# Patient Record
Sex: MALE | Race: WHITE | Employment: FULL TIME | URBAN - METROPOLITAN AREA
[De-identification: names, ages, dates, MRNs, and addresses within clinical notes are randomized per-mention and may not be internally consistent; named-entity substitution may affect disease eponyms.]

---

## 2022-01-17 ENCOUNTER — HOSPITAL ENCOUNTER (OUTPATIENT)
Dept: ULTRASOUND IMAGING | Age: 42
Discharge: HOME | End: 2022-01-17
Payer: COMMERCIAL

## 2022-01-17 DIAGNOSIS — R10.9 ABDOMINAL PAIN, UNSPECIFIED ABDOMINAL LOCATION: ICD-10-CM

## 2022-01-17 PROCEDURE — 76700 US EXAM ABDOM COMPLETE: CPT

## 2022-06-29 ENCOUNTER — TELEPHONE (OUTPATIENT)
Dept: PULMONOLOGY | Age: 42
End: 2022-06-29

## 2022-11-01 ENCOUNTER — OFFICE VISIT (OUTPATIENT)
Dept: SLEEP MEDICINE | Age: 42
End: 2022-11-01
Payer: COMMERCIAL

## 2022-11-01 VITALS
SYSTOLIC BLOOD PRESSURE: 130 MMHG | DIASTOLIC BLOOD PRESSURE: 70 MMHG | WEIGHT: 242.6 LBS | BODY MASS INDEX: 38.99 KG/M2 | HEIGHT: 66 IN | RESPIRATION RATE: 20 BRPM | OXYGEN SATURATION: 99 % | TEMPERATURE: 97.9 F | HEART RATE: 82 BPM

## 2022-11-01 DIAGNOSIS — E66.09 CLASS 2 OBESITY DUE TO EXCESS CALORIES WITHOUT SERIOUS COMORBIDITY WITH BODY MASS INDEX (BMI) OF 39.0 TO 39.9 IN ADULT: ICD-10-CM

## 2022-11-01 DIAGNOSIS — G47.19 EXCESSIVE DAYTIME SLEEPINESS: ICD-10-CM

## 2022-11-01 DIAGNOSIS — G47.33 OSA (OBSTRUCTIVE SLEEP APNEA): Primary | ICD-10-CM

## 2022-11-01 DIAGNOSIS — R06.83 SNORING: ICD-10-CM

## 2022-11-01 DIAGNOSIS — R06.81 WITNESSED EPISODE OF APNEA: ICD-10-CM

## 2022-11-01 PROCEDURE — 99204 OFFICE O/P NEW MOD 45 MIN: CPT | Performed by: PSYCHIATRY & NEUROLOGY

## 2022-11-01 ASSESSMENT — SLEEP AND FATIGUE QUESTIONNAIRES
HOW LIKELY ARE YOU TO NOD OFF OR FALL ASLEEP WHILE WATCHING TV: 3
NECK CIRCUMFERENCE (INCHES): 19
HOW LIKELY ARE YOU TO NOD OFF OR FALL ASLEEP WHILE SITTING AND READING: 1
HOW LIKELY ARE YOU TO NOD OFF OR FALL ASLEEP WHILE LYING DOWN TO REST IN THE AFTERNOON WHEN CIRCUMSTANCES PERMIT: 2
HOW LIKELY ARE YOU TO NOD OFF OR FALL ASLEEP WHILE SITTING INACTIVE IN A PUBLIC PLACE: 0
HOW LIKELY ARE YOU TO NOD OFF OR FALL ASLEEP IN A CAR, WHILE STOPPED FOR A FEW MINUTES IN TRAFFIC: 0
HOW LIKELY ARE YOU TO NOD OFF OR FALL ASLEEP WHILE SITTING AND TALKING TO SOMEONE: 0
HOW LIKELY ARE YOU TO NOD OFF OR FALL ASLEEP WHEN YOU ARE A PASSENGER IN A CAR FOR AN HOUR WITHOUT A BREAK: 3
HOW LIKELY ARE YOU TO NOD OFF OR FALL ASLEEP WHILE SITTING QUIETLY AFTER LUNCH WITHOUT ALCOHOL: 0
ESS TOTAL SCORE: 9

## 2022-11-01 ASSESSMENT — ENCOUNTER SYMPTOMS
SORE THROAT: 1
CHOKING: 1
EYES NEGATIVE: 1
APNEA: 1

## 2022-11-01 NOTE — PROGRESS NOTES
MD KECIA De La O Board Certified in Sleep Medicine  Certified [facility] Sleep Medicine  Board Certified in Neurology [address]  [facility] [address], [facility] 232 (2209 [facility]  [address], [address] [facility]           [address] [address] [phone]    Subjective:     Patient ID: Denver Rivas is a 40 y.o. male. Chief Complaint   Patient presents with    Establish Care    Snoring       HPI:        Denver Rivas is a 40 y.o. male referred by Hunter Montano for a sleep evaluation. He complains of snoring, snorting, choking, periods of not breathing, tossing and turning, kicking, excessive daytime sleepiness, feels sleepy during the day but he denies knees buckling with laughing, completely or partially paralyzed while falling asleep or waking up, noisy environment, uncomfortable room temperature, uncomfortable bedding. Symptoms began 4 years ago, gradually worsening since that time. The patient's bed-partner confirmed the snoring and stopped breathing at night. SLEEP SCHEDULE: Goes to bed around 10 PM in the weekdays and 12 AM in the weekends. It usually takes the patient 15-60 minutes to fall asleep. The patient gets up 0-1 per night to go to the bathroom. The Patient finally gets up at 7 AM during the weekdays and 9 AM in the weekends. patient wakes up with dry mouth and sometimes morning headache. . the headache usually dull headache. .   The patient has restless sleep with frequent arousals in addition to the Patient has significant daytime sleepiness. The Patient scored [place] Sleepiness Score: 9 on [place] Sleepiness Scale ( more than 10 is indicative of daytime sleepiness)and 60 in fatigue scale ( more than 36 is indicative of daytime fatigue). The patient takes seldom nap. Previous evaluation and treatment has included- none. The Patient has been obese for many years and tried, has gained 40 in the last 5 years,  unsuccessfully to lose weight through diet, exercise. DOT/CDL - N/A  ALDAIR/Link - N/A      Previous Report(s) Reviewed: historical medical records       Social History     Socioeconomic History    Marital status:      Spouse name: Not on file    Number of children: Not on file    Years of education: Not on file    Highest education level: Not on file   Occupational History    Not on file   Tobacco Use    Smoking status: Former     Packs/day: 1.00     Years: 10.00     Pack years: 10.00     Types: Cigarettes     Quit date: 2011     Years since quittin.7     Passive exposure: Past    Smokeless tobacco: Never   Vaping Use    Vaping Use: Never used   Substance and Sexual Activity    Alcohol use: Yes     Comment: socially    Drug use: No    Sexual activity: Not on file   Other Topics Concern    Not on file   Social History Narrative    Not on file     Social Determinants of Health     Financial Resource Strain: Not on file   Food Insecurity: Not on file   Transportation Needs: Not on file   Physical Activity: Not on file   Stress: Not on file   Social Connections: Not on file   Intimate Partner Violence: Not on file   Housing Stability: Not on file       Prior to Admission medications    Not on File       Allergies as of 2022    (No Known Allergies)       Patient Active Problem List   Diagnosis    Insomnia    Fatigue    Paronychia    Testicular pain    Pelvic pain in male    Irritable bowel syndrome    Fatty liver       Past Medical History:   Diagnosis Date    Elevated liver function tests     Fatty liver 2012    abdominal ultrasound     Fracture     left leg age 11- surgical repair    Insomnia     Irritable bowel syndrome     dicyclomine left.        Past Surgical History:   Procedure Laterality Date    APPENDECTOMY      ruptured    LEG SURGERY      age 11- left leg       Family History   Problem Relation Age of Onset    Other Mother         healthy    Other Father         healthy       Review of Systems   Constitutional:  Positive for diaphoresis and fatigue. HENT:  Positive for congestion and sore throat. Eyes: Negative. Respiratory:  Positive for apnea and choking. Cardiovascular: Negative. Endocrine: Negative. Genitourinary: Negative. Musculoskeletal:  Positive for arthralgias and myalgias. Neurological:  Positive for headaches. Hematological: Negative. Psychiatric/Behavioral:  Positive for decreased concentration. Objective:     Vitals:  Weight BMI Neck circumference    Wt Readings from Last 3 Encounters:   11/01/22 242 lb 9.6 oz (110 kg)   01/06/17 200 lb (90.7 kg)   01/30/13 206 lb 9.6 oz (93.7 kg)    Body mass index is 39.16 kg/m². Neck circumference (Inches): 19     BP HR SaO2   BP Readings from Last 3 Encounters:   11/01/22 130/70   01/06/17 126/82   01/30/13 122/80    Pulse Readings from Last 3 Encounters:   11/01/22 82   01/06/17 63   01/30/13 68    SpO2 Readings from Last 3 Encounters:   11/01/22 99%   01/06/17 100%        The mandibular molar Class :   [x]1 []2 []3      Mallampati I Airway Classification:   []1 []2 []3 [x]4        Physical Exam  Vitals and nursing note reviewed. Constitutional:       Appearance: Normal appearance. He is obese. HENT:      Head: Atraumatic. Nose: Nose normal.      Mouth/Throat:      Comments: Mallampati class 4, no retrognathia or hypognathia , normal airflow in bilateral nostrils, no septum deviation , crowded oropharynx, no tonsils enlargement. Eyes:      Extraocular Movements: Extraocular movements intact. Cardiovascular:      Rate and Rhythm: Normal rate and regular rhythm. Pulmonary:      Effort: Pulmonary effort is normal.      Breath sounds: Normal breath sounds. Musculoskeletal:      Cervical back: Neck supple. Right lower leg: No edema. Left lower leg: No edema. Neurological:      Mental Status: Mental status is at baseline. Psychiatric:         Mood and Affect: Mood normal.       Assessment:    Obstructive sleep apnea especially with snoring, snorting,  observed apnea, daytime sleepiness, large neck circumference, Mallampati class of 4 and obesity. Diagnosis Orders   1. BRUCE (obstructive sleep apnea)  Home Sleep Study    Sleep Study with PAP Titration      2. Snoring        3. Witnessed episode of apnea        4. Excessive daytime sleepiness        5. Class 2 obesity due to excess calories without serious comorbidity with body mass index (BMI) of 39.0 to 39.9 in adult          Plan:     Patient was counseled about the pathophysiology of obstructive sleep apnea syndrome and the methods for evaluating its presence and severity. Patient was counseled to avoid driving and other potentially hazardous circumstances if the patient is experiencing excessive sleepiness. Treatment considerations include the use of nasal CPAP, oral dental appliance or a surgical intervention, which should be based on otolarygologic findings, In the meantime, the patient should be cautioned to avoid the use of alcohol or other depressant medications because of potential for increasing the duration and severity of apnea and cautioned regarding driving or operating and dangerous equipment if the patient is experiencing daytime sleepiness. .  Weight loss: The proportionality between weight and AHI. With 10% weight change, the AHI has a 27% proportionate change. With 20% weight change, the AHI has a 45-50% proportionate change. The Patient accepts referral to bariatrics for further consideration of weight loss methods. Orders Placed This Encounter   Procedures    Home Sleep Study    Sleep Study with PAP Titration         Return for F/U between 31 and 90 days from the recieving CPAP.     Sully Cuevas MD  Medical Director - [facility]

## 2022-11-01 NOTE — PATIENT INSTRUCTIONS
Orders Placed This Encounter   Procedures    Home Sleep Study     Standing Status:   Future     Standing Expiration Date:   11/1/2023     Order Specific Question:   Location For Sleep Study     Answer:   [place]     Order Specific Question:   Select Sleep Lab Location     Answer:   [facility]    Sleep Study with PAP Titration     Standing Status:   Future     Standing Expiration Date:   11/1/2023     Order Specific Question:   Sleep Study Titration Type     Answer:   CPAP     Order Specific Question:   Location For Sleep Study     Answer:   [place]     Order Specific Question:   Select Sleep Lab Location     Answer:   [facility]

## 2022-11-22 ENCOUNTER — HOSPITAL ENCOUNTER (OUTPATIENT)
Dept: SLEEP CENTER | Age: 42
Discharge: HOME | End: 2022-11-22
Payer: COMMERCIAL

## 2022-11-22 DIAGNOSIS — G47.33 OSA (OBSTRUCTIVE SLEEP APNEA): ICD-10-CM

## 2022-11-22 PROCEDURE — 95806 SLEEP STUDY UNATT&RESP EFFT: CPT

## 2022-11-23 PROBLEM — G47.33 OSA (OBSTRUCTIVE SLEEP APNEA): Status: ACTIVE | Noted: 2022-11-23

## 2022-11-29 ENCOUNTER — TELEPHONE (OUTPATIENT)
Dept: PULMONOLOGY | Age: 42
End: 2022-11-29

## 2022-11-29 PROCEDURE — 95811 POLYSOM 6/>YRS CPAP 4/> PARM: CPT | Performed by: PSYCHIATRY & NEUROLOGY

## 2022-11-29 PROCEDURE — 95806 SLEEP STUDY UNATT&RESP EFFT: CPT | Performed by: PSYCHIATRY & NEUROLOGY

## 2022-11-29 NOTE — TELEPHONE ENCOUNTER
Sleep study showed severe BRUCE. AHI was 60.2  per hr. And O2 Desaturations to 63%. Dr Mitchell Bounds titration.      Pt notified of message  Took # to sleep lab to call and make an appt

## 2022-12-21 ENCOUNTER — HOSPITAL ENCOUNTER (OUTPATIENT)
Dept: SLEEP CENTER | Age: 42
Discharge: HOME | End: 2022-12-21
Payer: COMMERCIAL

## 2022-12-21 DIAGNOSIS — G47.33 OSA (OBSTRUCTIVE SLEEP APNEA): ICD-10-CM

## 2022-12-21 PROCEDURE — 95811 POLYSOM 6/>YRS CPAP 4/> PARM: CPT

## 2022-12-27 ENCOUNTER — TELEPHONE (OUTPATIENT)
Dept: PULMONOLOGY | Age: 42
End: 2022-12-27

## 2022-12-27 NOTE — TELEPHONE ENCOUNTER
PSG Sleep study showed severe BRUCE. AHI was 60.2  per hr. And O2 Desaturations to 63%. Dr Dominguez Morning: Follow up with the patient's sleep physician to discuss results  Avoid sedatives, alcohol and caffeinated drinks at bedtime. Avoid driving while sleepy. CPAP for 15 cm is ordered. DME:     SW patient and gave him the results of his sleep study. He will call [facility] to see if they are in his network.

## 2022-12-28 ENCOUNTER — TELEPHONE (OUTPATIENT)
Dept: PULMONOLOGY | Age: 42
End: 2022-12-28

## 2022-12-28 NOTE — TELEPHONE ENCOUNTER
Tammy Tian calls back and will use [address] as his new DME. Please fax order and state patient would like to set up here in our office with [facility].

## 2023-03-14 ENCOUNTER — OFFICE VISIT (OUTPATIENT)
Dept: SLEEP MEDICINE | Age: 43
End: 2023-03-14
Payer: COMMERCIAL

## 2023-03-14 VITALS
SYSTOLIC BLOOD PRESSURE: 180 MMHG | OXYGEN SATURATION: 99 % | DIASTOLIC BLOOD PRESSURE: 100 MMHG | WEIGHT: 245.2 LBS | BODY MASS INDEX: 39.41 KG/M2 | HEIGHT: 66 IN | HEART RATE: 82 BPM | RESPIRATION RATE: 18 BRPM | TEMPERATURE: 96.9 F

## 2023-03-14 DIAGNOSIS — Z99.89 OSA ON CPAP: Primary | ICD-10-CM

## 2023-03-14 DIAGNOSIS — G47.33 OSA ON CPAP: Primary | ICD-10-CM

## 2023-03-14 DIAGNOSIS — G47.00 INSOMNIA WITH SLEEP APNEA: ICD-10-CM

## 2023-03-14 DIAGNOSIS — Z99.89 DEPENDENCE ON OTHER ENABLING MACHINES AND DEVICES: ICD-10-CM

## 2023-03-14 DIAGNOSIS — G47.30 INSOMNIA WITH SLEEP APNEA: ICD-10-CM

## 2023-03-14 PROCEDURE — 99214 OFFICE O/P EST MOD 30 MIN: CPT | Performed by: PSYCHIATRY & NEUROLOGY

## 2023-03-14 RX ORDER — DOXEPIN HYDROCHLORIDE 10 MG/1
10 CAPSULE ORAL NIGHTLY
Qty: 30 CAPSULE | Refills: 3 | Status: SHIPPED | OUTPATIENT
Start: 2023-03-14

## 2023-03-14 RX ORDER — LANOLIN ALCOHOL/MO/W.PET/CERES
3 CREAM (GRAM) TOPICAL DAILY
COMMUNITY

## 2023-03-14 ASSESSMENT — SLEEP AND FATIGUE QUESTIONNAIRES
HOW LIKELY ARE YOU TO NOD OFF OR FALL ASLEEP WHEN YOU ARE A PASSENGER IN A CAR FOR AN HOUR WITHOUT A BREAK: 2
HOW LIKELY ARE YOU TO NOD OFF OR FALL ASLEEP WHILE SITTING QUIETLY AFTER LUNCH WITHOUT ALCOHOL: 0
HOW LIKELY ARE YOU TO NOD OFF OR FALL ASLEEP WHILE SITTING AND READING: 1
HOW LIKELY ARE YOU TO NOD OFF OR FALL ASLEEP WHILE LYING DOWN TO REST IN THE AFTERNOON WHEN CIRCUMSTANCES PERMIT: 2
HOW LIKELY ARE YOU TO NOD OFF OR FALL ASLEEP WHILE SITTING INACTIVE IN A PUBLIC PLACE: 1
HOW LIKELY ARE YOU TO NOD OFF OR FALL ASLEEP WHILE WATCHING TV: 1
ESS TOTAL SCORE: 7
HOW LIKELY ARE YOU TO NOD OFF OR FALL ASLEEP IN A CAR, WHILE STOPPED FOR A FEW MINUTES IN TRAFFIC: 0
HOW LIKELY ARE YOU TO NOD OFF OR FALL ASLEEP WHILE SITTING AND TALKING TO SOMEONE: 0

## 2023-03-14 NOTE — PROGRESS NOTES
MD KECIA Cano Board Certified in Sleep Medicine  Certified in [address] [place] [address] Certified in Neurology Jamasofía Steele [address] [address] [facility], MAURIZIO [address]  [phone]   [address] [place] [address], [address] [place] [address] [address]                      [address]  [address] [address] [phone]    Subjective:     Patient ID: Domenica Sweeney is a 40 y.o. male. Chief Complaint   Patient presents with    Follow-up    Sleep Apnea         HPI:        Domenica Sweeney is a 40 y.o. male was seen today as a follow for obstructive sleep apnea. The patient underwent home sleep testing on 11/22/2022, the overnight registration revealed severe obstructive sleep apnea with apnea hypopnea index of 60.2/hr with lowest O2 saturation of 63%, patient spent about 154 minutes below 90% (weight was 242 pounds). Subsequently, the patient underwent successful PAP titration on 12/21/2022, the lowest O2 saturation while on PAP was 95%. Patient is using the PAP machine about 47% of the time, more than 4 hours a nightabout  11 %, in total average of 2:29 hours a night in last 53 days. Currently on PAP at 15 cm (), the AHI is only 3.9 events per hour at this pressure. Patient improved regarding daytime sleepiness and fatigue, wakes up refreshed in the morning. The Patient scored [place] Sleepiness Score: 7 on [place] Sleepiness Scale ( more than 10 is indicative of daytime sleepiness)   Patient has no problem with PAP pressure or mask, the nasal pillow did not work for him, can not sleep with mask on. Wakes up in the morning with dry mouth, the setting of the heated humidifier at # auto. BP is stable. Has gained 3 pounds since last visit.    DOT/CDL - N/A        Previous Report(s)Reviewed: historical medical records         Social History Socioeconomic History    Marital status:      Spouse name: Not on file    Number of children: Not on file    Years of education: Not on file    Highest education level: Not on file   Occupational History    Not on file   Tobacco Use    Smoking status: Former     Packs/day: 1.00     Years: 10.00     Pack years: 10.00     Types: Cigarettes     Quit date: 2011     Years since quittin.1     Passive exposure: Past    Smokeless tobacco: Never   Vaping Use    Vaping Use: Never used   Substance and Sexual Activity    Alcohol use: Yes     Comment: socially    Drug use: No    Sexual activity: Not on file   Other Topics Concern    Not on file   Social History Narrative    Not on file     Social Determinants of Health     Financial Resource Strain: Not on file   Food Insecurity: Not on file   Transportation Needs: Not on file   Physical Activity: Not on file   Stress: Not on file   Social Connections: Not on file   Intimate Partner Violence: Not on file   Housing Stability: Not on file       Prior to Admission medications    Medication Sig Start Date End Date Taking? Authorizing Provider   melatonin 3 MG TABS tablet Take 3 mg by mouth daily   Yes Historical Provider, MD   doxepin (SINEQUAN) 10 MG capsule Take 1 capsule by mouth nightly 3/14/23  Yes Cas Vang MD       Allergies as of 2023    (No Known Allergies)       Patient Active Problem List   Diagnosis    Insomnia    Fatigue    Paronychia    Testicular pain    Pelvic pain in male    Irritable bowel syndrome    Fatty liver    BRUCE (obstructive sleep apnea)       Past Medical History:   Diagnosis Date    Elevated liver function tests     Fatty liver 2012    abdominal ultrasound     Fracture     left leg age 11- surgical repair    Insomnia     Irritable bowel syndrome     dicyclomine left.     Sleep apnea        Past Surgical History:   Procedure Laterality Date    APPENDECTOMY  1987    ruptured    LEG SURGERY      age 11- left leg Family History   Problem Relation Age of Onset    Other Mother         healthy    Other Father         healthy       Review of Systems    Objective:     Vitals:  Weight BMI Neck circumference    Wt Readings from Last 3 Encounters:   03/14/23 245 lb 3.2 oz (111.2 kg)   11/01/22 242 lb 9.6 oz (110 kg)   01/06/17 200 lb (90.7 kg)    Body mass index is 39.58 kg/m². BP HR SaO2   BP Readings from Last 3 Encounters:   03/14/23 (!) 150/80   11/01/22 130/70   01/06/17 126/82    Pulse Readings from Last 3 Encounters:   03/14/23 82   11/01/22 82   01/06/17 63    SpO2 Readings from Last 3 Encounters:   03/14/23 99%   11/01/22 99%   01/06/17 100%        Themandibular molar Class :   [x]1 []2 []3      Mallampati I Airway Classification:   []1 []2 []3 [x]4      Physical Exam  Vitals and nursing note reviewed. Constitutional:       Appearance: Normal appearance. Cardiovascular:      Rate and Rhythm: Normal rate and regular rhythm. Pulmonary:      Effort: Pulmonary effort is normal.      Breath sounds: Normal breath sounds. Musculoskeletal:      Right lower leg: No edema. Left lower leg: No edema.       :   Severe Obstructive Sleep Apnea/Hypopnea Syndrome , still unable to use every night. Diagnosis Orders   1. BRUCE on CPAP        2. Dependence on other enabling machines and devices        3. Insomnia with sleep apnea  doxepin (SINEQUAN) 10 MG capsule        Plan:      I encouraged the patient to use the PAP on nightly basis. I adjusted the PAP pressure to the PAP pressure of 10-15  cmwp without ramp. I educated the Patient about the PAP machine including how to change the heated humidifier. I educated to use the PAP every night more than 4 hours at least.  Will try the F30 mask tonight. I fitted him with the mask and pressure. I will order PAP supplies, mask, filters. ... No orders of the defined types were placed in this encounter.       Return in about 5 weeks (around 4/18/2023) for Reveiwing CPAP usage and compliance report and tro.    Michele Caballero MD  Medical Director - [facility]

## 2024-03-18 DIAGNOSIS — G47.30 INSOMNIA WITH SLEEP APNEA: ICD-10-CM

## 2024-03-18 DIAGNOSIS — G47.00 INSOMNIA WITH SLEEP APNEA: ICD-10-CM

## 2024-03-18 RX ORDER — DOXEPIN HYDROCHLORIDE 10 MG/1
10 CAPSULE ORAL NIGHTLY
Qty: 30 CAPSULE | Refills: 3 | OUTPATIENT
Start: 2024-03-18